# Patient Record
Sex: FEMALE | Race: WHITE | URBAN - METROPOLITAN AREA
[De-identification: names, ages, dates, MRNs, and addresses within clinical notes are randomized per-mention and may not be internally consistent; named-entity substitution may affect disease eponyms.]

---

## 2024-09-17 ENCOUNTER — TELEPHONE (OUTPATIENT)
Facility: CLINIC | Age: 44
End: 2024-09-17

## 2024-09-17 ENCOUNTER — OFFICE VISIT (OUTPATIENT)
Facility: CLINIC | Age: 44
End: 2024-09-17
Payer: COMMERCIAL

## 2024-09-17 VITALS
HEART RATE: 76 BPM | WEIGHT: 190 LBS | BODY MASS INDEX: 28.14 KG/M2 | OXYGEN SATURATION: 100 % | DIASTOLIC BLOOD PRESSURE: 70 MMHG | HEIGHT: 69 IN | SYSTOLIC BLOOD PRESSURE: 110 MMHG

## 2024-09-17 DIAGNOSIS — E06.3 HASHIMOTO THYROIDITIS: ICD-10-CM

## 2024-09-17 DIAGNOSIS — Z12.11 COLON CANCER SCREENING: ICD-10-CM

## 2024-09-17 DIAGNOSIS — Z00.00 ENCOUNTER FOR ROUTINE ADULT HEALTH EXAMINATION WITHOUT ABNORMAL FINDINGS: Primary | ICD-10-CM

## 2024-09-17 PROBLEM — N60.02 CYST OF LEFT BREAST: Status: ACTIVE | Noted: 2024-07-01

## 2024-09-17 PROBLEM — I35.9 AORTIC VALVE DISORDER: Status: ACTIVE | Noted: 2024-07-01

## 2024-09-17 PROCEDURE — 3074F SYST BP LT 130 MM HG: CPT | Performed by: FAMILY MEDICINE

## 2024-09-17 PROCEDURE — 3008F BODY MASS INDEX DOCD: CPT | Performed by: FAMILY MEDICINE

## 2024-09-17 PROCEDURE — 3078F DIAST BP <80 MM HG: CPT | Performed by: FAMILY MEDICINE

## 2024-09-17 PROCEDURE — 99386 PREV VISIT NEW AGE 40-64: CPT | Performed by: FAMILY MEDICINE

## 2024-09-17 RX ORDER — COPPER 313.4 MG/1
1 INTRAUTERINE DEVICE INTRAUTERINE ONCE
COMMUNITY

## 2024-09-17 NOTE — TELEPHONE ENCOUNTER
Called Dr. Mccray [facility] specialist for women to get medical records. Left a voicemail and waiting for a call back.

## 2024-09-17 NOTE — PROGRESS NOTES
HPI:   Brittany Aragon is a 44 year old female who presents for a complete physical exam.     Has a history of Hashimoto's, but has not been on any medication for it.   There is a strong history of thoracic aneurysms on his dad's side, and she is being monitored with an MRI every other year.   Just had blood work in July and it came back normal.     Last pap: 2 years ago and normal  Last mammogram: 3/2024 and normal    Menses: Regular, monthly cycles   Contraception:  Paragard IUD since 2016    Previous colonoscopy: 5/2024 and one polyp-told to repeat in 7 years   History of STD's: None  History of intimate partner violence: None  Family hx of breast, ovarian, cervical or colon CA: MGM with breast cancer in her 80's  Diet and exercise: Eats a healthy, well balanced diet. Exercises with [employer] four days a week. Drinks water, but could drink more.   Immunizations:  Tdap: 2016, Covid: Completed    REVIEW OF SYSTEMS:   GENERAL: feels well otherwise   SKIN: denies any unusual skin lesions  EYES: no vision problems  BREAST: no lumps or masses, no nipple discharge   LUNGS: denies shortness of breath  CARDIOVASCULAR: denies chest pain  GI: denies abdominal pain,  No constipation or diarrhea, no hematochezia  : denies dysuria, vaginal discharge or itching  NEURO: denies headaches  PSYCHE: denies depression or anxiety          Current Outpatient Medications   Medication Sig Dispense Refill    intrauterine copper contraceptive Intrauterine IUD 1 Intra Uterine Device by Intrauterine route once.       No Known Allergies   History reviewed. No pertinent past medical history.   History reviewed. No pertinent surgical history.   Family History   Problem Relation Age of Onset    Other (Thoracic aneursym) Father     Heart Attack Father     Breast Cancer Maternal Grandmother     Stroke Paternal Grandmother     Hypertension Paternal Grandmother     Other (Thoracic aneurysm) Paternal Aunt     Other (AAA) Paternal Uncle        Social History:   Social History     Socioeconomic History    Marital status:    Tobacco Use    Smoking status: Never    Smokeless tobacco: Never   Vaping Use    Vaping status: Never Used   Substance and Sexual Activity    Alcohol use: Yes    Drug use: Never     Occ: -works in compliance. : Yes. Children: 2.         EXAM:     Wt Readings from Last 6 Encounters:   09/17/24 190 lb (86.2 kg)     Body mass index is 28.06 kg/m².   /70   Pulse 76   Ht 5' 9\" (1.753 m)   Wt 190 lb (86.2 kg)   SpO2 100%   BMI 28.06 kg/m²     GENERAL: well developed, well nourished, in no apparent distress   SKIN: no rashes, no suspicious lesions  HEENT: atraumatic, normocephalic, throat clear; normal dentition  EYES: PERRLA, EOMI, conjunctiva are clear  NECK: supple, no adenopathy or thyroid masses   BREAST: deferred, sees gynecologist   LUNGS: clear to auscultation  CARDIO: RRR without murmur  GI: good bowel sounds, no masses, HSM or tenderness  : deferred, sees gynecologist   EXTREMITIES: no edema    No results found for: \"CHOLEST\", \"HDL\", \"LDL\", \"TRIGLY\"   ASSESSMENT AND PLAN:   Brittany Aragon is a 44 year old female who presents for a complete physical exam.  Encounter Diagnoses   Name Primary?    Encounter for routine adult health examination without abnormal findings Yes    Colon cancer screening     Hashimoto thyroiditis      Had blood work recently with gynecologist, and will fax release to obtain results of this and her pap smear, colonoscopy, as well as mammogram.     Discussed with patient the following:  -Monthly breast self-exam  -Breast cancer screening/mammograms and clinical breast exams  -Cervical cancer screening/pap smears  -Colon cancer screening/colonoscopy  -Adequate calcium and Vitamin D intake to prevent osteoporosis  -Healthy diet including adequate intake of vegetables and fruits, appropriate portion sizes, minimizing highly concentrated carbohydrate foods  -Exercising 30 minutes  a day most days of the week   -Diabetes screening which she desires  -Cholesterol screening which she desires  -Recommendation for yearly influenza vaccine  -Need for Tdap once as an adult and Td booster every 10 years  -Need for Zoster vaccine for patients >= 50  -Contraception: IUD  -STI screening (GC/Chlamydia/HIV): Not indicated   -Hepatitis C screening for all adults between the ages of 18 and 79: Will look for previous results     All questions were answered during the visit and the patient verbalizes understanding. She will return in one year for next WWE or sooner as needed.    Meds & Refills for this Visit:  Requested Prescriptions      No prescriptions requested or ordered in this encounter       Imaging & Consults:  None    Gwen Curry DO  9/17/2024  8:38 AM